# Patient Record
Sex: FEMALE | Race: WHITE
[De-identification: names, ages, dates, MRNs, and addresses within clinical notes are randomized per-mention and may not be internally consistent; named-entity substitution may affect disease eponyms.]

---

## 2019-07-23 ENCOUNTER — HOSPITAL ENCOUNTER (OUTPATIENT)
Dept: HOSPITAL 92 - SCSULT | Age: 24
Discharge: HOME | End: 2019-07-23
Attending: OBSTETRICS & GYNECOLOGY
Payer: COMMERCIAL

## 2019-07-23 DIAGNOSIS — N91.0: Primary | ICD-10-CM

## 2019-07-23 PROCEDURE — 76856 US EXAM PELVIC COMPLETE: CPT

## 2019-07-23 PROCEDURE — 93976 VASCULAR STUDY: CPT

## 2019-07-23 NOTE — ULT
PELVIC ULTRASOUND WITH VASCULAR DUPLEX INCLUDING COLOR AND SPECTRAL Doppler IMAGIN19

 

HISTORY: 

Amenorrhea, evaluate whether there is a uterus present or not. 

 

Exam was ordered as a transabdominal, no transvaginal. 

 

No evidence for a normal uterus is seen.  Right ovary measures 4.6 x 2.5 x 2.6 cm. 

 

Left ovary measures 4.3 x 2.5 x 2.6 cm. No evidence for solid or cystic mass. 

 

Vascular duplex with color and spectral Doppler imaging demonstrates vascular flow to both ovaries. N
o evidence for ovarian torsion. No abscess or abnormal fluid collection.

 

IMPRESSION: 

No evidence for a normal uterus. Somewhat prominent size bilateral ovaries. No evidence for solid of 
cystic mass or abscess or abnormal fluid collection  or other acute process. 

 

POS: [facility]